# Patient Record
Sex: FEMALE | Race: WHITE
[De-identification: names, ages, dates, MRNs, and addresses within clinical notes are randomized per-mention and may not be internally consistent; named-entity substitution may affect disease eponyms.]

---

## 2017-02-02 ENCOUNTER — APPOINTMENT (OUTPATIENT)
Dept: ORTHOPEDIC SURGERY | Facility: CLINIC | Age: 67
End: 2017-02-02

## 2017-02-02 VITALS — SYSTOLIC BLOOD PRESSURE: 119 MMHG | HEART RATE: 76 BPM | DIASTOLIC BLOOD PRESSURE: 74 MMHG

## 2017-02-02 VITALS — HEIGHT: 66 IN | BODY MASS INDEX: 28.61 KG/M2 | WEIGHT: 178 LBS

## 2017-04-11 ENCOUNTER — RX RENEWAL (OUTPATIENT)
Age: 67
End: 2017-04-11

## 2017-09-28 ENCOUNTER — RX RENEWAL (OUTPATIENT)
Age: 67
End: 2017-09-28

## 2018-02-08 ENCOUNTER — OTHER (OUTPATIENT)
Age: 68
End: 2018-02-08

## 2018-02-08 ENCOUNTER — APPOINTMENT (OUTPATIENT)
Dept: ORTHOPEDIC SURGERY | Facility: CLINIC | Age: 68
End: 2018-02-08
Payer: MEDICARE

## 2018-02-08 VITALS
DIASTOLIC BLOOD PRESSURE: 68 MMHG | HEIGHT: 64 IN | HEART RATE: 75 BPM | SYSTOLIC BLOOD PRESSURE: 130 MMHG | WEIGHT: 170 LBS | BODY MASS INDEX: 29.02 KG/M2

## 2018-02-08 DIAGNOSIS — M54.6 PAIN IN THORACIC SPINE: ICD-10-CM

## 2018-02-08 DIAGNOSIS — M47.812 SPONDYLOSIS W/OUT MYELOPATHY OR RADICULOPATHY, CERVICAL REGION: ICD-10-CM

## 2018-02-08 DIAGNOSIS — M43.16 SPONDYLOLISTHESIS, LUMBAR REGION: ICD-10-CM

## 2018-02-08 PROCEDURE — 99215 OFFICE O/P EST HI 40 MIN: CPT

## 2018-02-08 PROCEDURE — 72040 X-RAY EXAM NECK SPINE 2-3 VW: CPT

## 2018-02-08 PROCEDURE — 72070 X-RAY EXAM THORAC SPINE 2VWS: CPT

## 2018-02-08 PROCEDURE — 72100 X-RAY EXAM L-S SPINE 2/3 VWS: CPT

## 2018-02-22 ENCOUNTER — APPOINTMENT (OUTPATIENT)
Dept: ORTHOPEDIC SURGERY | Facility: CLINIC | Age: 68
End: 2018-02-22
Payer: MEDICARE

## 2018-02-22 VITALS
WEIGHT: 172 LBS | SYSTOLIC BLOOD PRESSURE: 143 MMHG | DIASTOLIC BLOOD PRESSURE: 78 MMHG | HEART RATE: 71 BPM | BODY MASS INDEX: 29.52 KG/M2

## 2018-02-22 PROCEDURE — 73562 X-RAY EXAM OF KNEE 3: CPT | Mod: 50

## 2018-02-22 PROCEDURE — 99214 OFFICE O/P EST MOD 30 MIN: CPT

## 2018-02-22 PROCEDURE — 72170 X-RAY EXAM OF PELVIS: CPT

## 2018-03-08 ENCOUNTER — APPOINTMENT (OUTPATIENT)
Dept: ORTHOPEDIC SURGERY | Facility: CLINIC | Age: 68
End: 2018-03-08
Payer: MEDICARE

## 2018-03-08 VITALS — SYSTOLIC BLOOD PRESSURE: 133 MMHG | HEART RATE: 82 BPM | DIASTOLIC BLOOD PRESSURE: 78 MMHG

## 2018-03-08 PROCEDURE — 20610 DRAIN/INJ JOINT/BURSA W/O US: CPT | Mod: 50

## 2018-03-15 ENCOUNTER — APPOINTMENT (OUTPATIENT)
Dept: ORTHOPEDIC SURGERY | Facility: CLINIC | Age: 68
End: 2018-03-15
Payer: MEDICARE

## 2018-03-15 PROCEDURE — 20610 DRAIN/INJ JOINT/BURSA W/O US: CPT | Mod: 50

## 2018-03-22 ENCOUNTER — APPOINTMENT (OUTPATIENT)
Dept: ORTHOPEDIC SURGERY | Facility: CLINIC | Age: 68
End: 2018-03-22
Payer: MEDICARE

## 2018-03-22 PROCEDURE — 20610 DRAIN/INJ JOINT/BURSA W/O US: CPT | Mod: 50

## 2018-05-17 ENCOUNTER — APPOINTMENT (OUTPATIENT)
Dept: ORTHOPEDIC SURGERY | Facility: CLINIC | Age: 68
End: 2018-05-17

## 2018-07-23 PROBLEM — M47.812 CERVICAL SPONDYLOSIS: Status: ACTIVE | Noted: 2018-02-08

## 2018-08-13 ENCOUNTER — APPOINTMENT (OUTPATIENT)
Dept: ORTHOPEDIC SURGERY | Facility: CLINIC | Age: 68
End: 2018-08-13
Payer: MEDICARE

## 2018-08-13 VITALS
HEART RATE: 82 BPM | WEIGHT: 175 LBS | HEIGHT: 64 IN | BODY MASS INDEX: 29.88 KG/M2 | SYSTOLIC BLOOD PRESSURE: 122 MMHG | DIASTOLIC BLOOD PRESSURE: 78 MMHG

## 2018-08-13 PROCEDURE — 99213 OFFICE O/P EST LOW 20 MIN: CPT

## 2018-09-14 ENCOUNTER — RX RENEWAL (OUTPATIENT)
Age: 68
End: 2018-09-14

## 2018-11-27 ENCOUNTER — RX RENEWAL (OUTPATIENT)
Age: 68
End: 2018-11-27

## 2019-08-21 ENCOUNTER — APPOINTMENT (OUTPATIENT)
Dept: ORTHOPEDIC SURGERY | Facility: CLINIC | Age: 69
End: 2019-08-21
Payer: MEDICARE

## 2019-08-21 PROCEDURE — 73565 X-RAY EXAM OF KNEES: CPT

## 2019-08-21 PROCEDURE — 99213 OFFICE O/P EST LOW 20 MIN: CPT

## 2019-08-21 RX ORDER — IBUPROFEN 400 MG/1
400 TABLET, FILM COATED ORAL 3 TIMES DAILY
Qty: 90 | Refills: 0 | Status: ACTIVE | COMMUNITY
Start: 2019-08-21 | End: 1900-01-01

## 2019-08-21 NOTE — DISCUSSION/SUMMARY
[de-identified] : 67-year-old female with bilateral knee osteoarthritis\par \par Patient presents for refill on physical therapy bilateral knees and repeat of HA injections. Patient has degenerative arthrosis. \par \par Prescription provided and medication ordered. \par \par Followup for injection therapy.

## 2019-08-21 NOTE — PHYSICAL EXAM
[Antalgic] : antalgic [Poor Appearance] : well-appearing [Acute Distress] : not in acute distress [Obese] : not obese [FreeTextEntry2] : Oriented to time, place, person\par Mood: Normal\par Affect: Normal\par \par Right knee exam\par \par Skin: Clean, dry, intact\par Inspection: Varus malalignment, no masses, mild swelling, mild effusion\par Pulses: 2+ DP/PT pulses\par ROM: 0-110 degrees of flexion. No pain with deep knee flexion/extension.\par Tenderness: Mild MJLT. Mild LJLT. Positive pain over the patella facets. No pain to the quadriceps tendon. No pain to the patella tendon. No posterior knee tenderness.\par Stability: Stable to varus, valgus. Negative lachman testing. Negative anterior drawer, negative posterior drawer.\par Strength: 4+/5 Q/H/TA/GS/EHL, without atrophy\par Neuro: In tact to light touch throughout, DTR's normal\par Additional tests: None McMurrays test, mild patellar grind test. \par \par  [de-identified] : Images were reviewed from [facility] dated 8/21/19. \par \par Multiple images right and left knee showed no evidence of bony injury, or catherine dislocation. There is severe medial  degenerative arthritic change seen. Overall alignment is varus. Otherwise unremarkable.

## 2019-08-21 NOTE — HISTORY OF PRESENT ILLNESS
[All Other ROS Normal] : All other review of systems are negative except as noted [Joint Pain] : joint pain [Joint Stiffness] : joint stiffness [Joint Swelling] : joint swelling [Past Hx] : past medical [Fam Hx] : family [All] : medication and allergy [Soc Hx] : social [FreeTextEntry1] : Bilateral knee OA [FreeTextEntry2] : 66 yo female presents with her daughter for chronic, bilateral knee degenerative arthritis - last seen in 2018.  She is attending PT which she finds it beneficial. She received HA injection 3/2018 which gave her great relief. She would like to continue PT and restart HA injections. Rate her pain  7/10 and she is not taking main medication.

## 2019-09-03 ENCOUNTER — NON-APPOINTMENT (OUTPATIENT)
Age: 69
End: 2019-09-03

## 2019-09-03 ENCOUNTER — APPOINTMENT (OUTPATIENT)
Dept: OPHTHALMOLOGY | Facility: CLINIC | Age: 69
End: 2019-09-03
Payer: MEDICARE

## 2019-09-03 PROCEDURE — 92004 COMPRE OPH EXAM NEW PT 1/>: CPT

## 2019-09-09 ENCOUNTER — APPOINTMENT (OUTPATIENT)
Dept: ORTHOPEDIC SURGERY | Facility: CLINIC | Age: 69
End: 2019-09-09
Payer: MEDICARE

## 2019-09-09 VITALS
BODY MASS INDEX: 29.88 KG/M2 | HEART RATE: 71 BPM | SYSTOLIC BLOOD PRESSURE: 130 MMHG | WEIGHT: 175 LBS | HEIGHT: 64 IN | DIASTOLIC BLOOD PRESSURE: 74 MMHG

## 2019-09-09 PROCEDURE — 20610 DRAIN/INJ JOINT/BURSA W/O US: CPT | Mod: RT

## 2019-09-09 PROCEDURE — 99214 OFFICE O/P EST MOD 30 MIN: CPT | Mod: 25

## 2019-09-09 NOTE — PROCEDURE
[de-identified] : Injection: Right knee joint.\par Indication: Osteoarthritis.\par \par A discussion was had with the patient regarding this procedure and all questions were answered. All risks, benefits and alternatives were discussed. These included but were not limited to bleeding, infection, and allergic reaction. Alcohol was used to clean the skin, and betadine was used to sterilize and prep the area in the supero-lateral aspect of the right knee. Ethyl chloride spray was then used as a topical anesthetic. A 21-gauge needle was used to inject 2 cc of Euflexxa into the knee. A sterile bandage was then applied. The patient tolerated the procedure well and there were no complications. \par \par Injection: Left knee joint.\par Indication: Osteoarthritis.\par \par A discussion was had with the patient regarding this procedure and all questions were answered. All risks, benefits and alternatives were discussed. These included but were not limited to bleeding, infection, and allergic reaction. Alcohol was used to clean the skin, and betadine was used to sterilize and prep the area in the supero-lateral aspect of the left knee. Ethyl chloride spray was then used as a topical anesthetic. A 21-gauge needle was used to inject 2 cc of Euflexxa into the knee. A sterile bandage was then applied. The patient tolerated the procedure well and there were no complications.

## 2019-09-09 NOTE — END OF VISIT
[FreeTextEntry3] : 68-year-old female with bilateral knee osteoarthritis\par \par The first of three Euflexxa injections was given today under sterile conditions into the bilateral knee joint without complication. The patient was instructed on modification of activities over the next 48-72 hours. I advised the patient to ice the knee as needed for control of local irritation from the injection. I advised that some patients have immediate benefit from the initial injection therapy, however it usually takes the medication a number of weeks (~8wks) to provide significant relief of osteoarthritic symptoms. \par \par We will see the patient back for the second injection in a weeks time.

## 2019-09-16 ENCOUNTER — APPOINTMENT (OUTPATIENT)
Dept: ORTHOPEDIC SURGERY | Facility: CLINIC | Age: 69
End: 2019-09-16
Payer: MEDICARE

## 2019-09-16 PROCEDURE — 20610 DRAIN/INJ JOINT/BURSA W/O US: CPT | Mod: 50

## 2019-09-16 NOTE — END OF VISIT
[FreeTextEntry3] : 68-year-old female with bilateral knee osteoarthritis\par \par The second of three Euflexxa injections was given today under sterile conditions into the bilateral knee joint without complication. I again discussed the role of activity modification/icing following the injection to treat any local irritation from the injection. \par \par We'll have the patient followup for the final injection next week.

## 2019-09-16 NOTE — PROCEDURE
[de-identified] : Injection: Right knee joint.\par Indication: Osteoarthritis.\par \par A discussion was had with the patient regarding this procedure and all questions were answered. All risks, benefits and alternatives were discussed. These included but were not limited to bleeding, infection, and allergic reaction. Alcohol was used to clean the skin, and betadine was used to sterilize and prep the area in the supero-lateral aspect of the right knee. Ethyl chloride spray was then used as a topical anesthetic. A 21-gauge needle was used to inject 2 cc of Euflexxa into the knee. A sterile bandage was then applied. The patient tolerated the procedure well and there were no complications. \par \par Injection: Left knee joint.\par Indication: Osteoarthritis.\par \par A discussion was had with the patient regarding this procedure and all questions were answered. All risks, benefits and alternatives were discussed. These included but were not limited to bleeding, infection, and allergic reaction. Alcohol was used to clean the skin, and betadine was used to sterilize and prep the area in the supero-lateral aspect of the left knee. Ethyl chloride spray was then used as a topical anesthetic. A 21-gauge needle was used to inject 2 cc of Euflexxa into the knee. A sterile bandage was then applied. The patient tolerated the procedure well and there were no complications.

## 2019-09-23 ENCOUNTER — APPOINTMENT (OUTPATIENT)
Dept: ORTHOPEDIC SURGERY | Facility: CLINIC | Age: 69
End: 2019-09-23
Payer: MEDICARE

## 2019-09-23 DIAGNOSIS — M17.12 UNILATERAL PRIMARY OSTEOARTHRITIS, LEFT KNEE: ICD-10-CM

## 2019-09-23 DIAGNOSIS — M17.11 UNILATERAL PRIMARY OSTEOARTHRITIS, RIGHT KNEE: ICD-10-CM

## 2019-09-23 PROCEDURE — 20610 DRAIN/INJ JOINT/BURSA W/O US: CPT | Mod: 50

## 2019-09-24 NOTE — PROCEDURE
[de-identified] : Injection: Bilateral knee joints.\par \par Indication: Osteoarthritis.\par \par A discussion was had with the patient regarding this procedure and all questions were answered. All risks, benefits and alternatives were discussed. These included but were not limited to bleeding, infection, and allergic reaction. Alcohol was used to clean the skin, and betadine was used to sterilize and prep the area in the supero-lateral aspect of both knees. Ethyl chloride spray was then used as a topical anesthetic. A 21-gauge needle was used to inject 2 cc of Euflexxa into the knee. A sterile bandage was then applied. The patient tolerated the procedure well and there were no complications.		\par

## 2019-09-24 NOTE — END OF VISIT
[FreeTextEntry3] : The first of three Euflexxa injections was given today under sterile conditions into the ___ knee joint without complication. The patient was instructed on modification of activities over the next 48-72 hours. I advised the patient to ice the knee as needed for control of local irritation from the injection. I advised that some patients have immediate benefit from the initial injection therapy, however it usually takes the medication a number of weeks (~8wks) to provide significant relief of osteoarthritic symptoms.\par \par  We will see the patient back for the second injection in a weeks time.

## 2019-09-24 NOTE — ADDENDUM
[FreeTextEntry1] : This note was written by Nanci White on 09/23/2019 acting solely as a scribe for Dr. Jay Elizalde.\par \par All medical record entries made by the Scribe were at my, Dr. Jay Elizalde, direction and personally dictated by me on 09/23/2019. I have personally reviewed the chart and agree that the record accurately reflects my personal performance of the history, physical exam, assessment and plan.

## 2019-09-24 NOTE — PROCEDURE
[de-identified] : Injection: Bilateral knee joints.\par \par Indication: Osteoarthritis.\par \par A discussion was had with the patient regarding this procedure and all questions were answered. All risks, benefits and alternatives were discussed. These included but were not limited to bleeding, infection, and allergic reaction. Alcohol was used to clean the skin, and betadine was used to sterilize and prep the area in the supero-lateral aspect of both knees. Ethyl chloride spray was then used as a topical anesthetic. A 21-gauge needle was used to inject 2 cc of Euflexxa into the knee. A sterile bandage was then applied. The patient tolerated the procedure well and there were no complications.		\par

## 2020-06-18 ENCOUNTER — APPOINTMENT (OUTPATIENT)
Dept: ORTHOPEDIC SURGERY | Facility: CLINIC | Age: 70
End: 2020-06-18
Payer: MEDICARE

## 2020-06-18 VITALS — HEIGHT: 64 IN

## 2020-06-18 VITALS — BODY MASS INDEX: 30.21 KG/M2 | WEIGHT: 176 LBS

## 2020-06-18 VITALS — TEMPERATURE: 97.8 F

## 2020-06-18 PROCEDURE — 20610 DRAIN/INJ JOINT/BURSA W/O US: CPT | Mod: 50

## 2020-06-18 PROCEDURE — 99214 OFFICE O/P EST MOD 30 MIN: CPT | Mod: 25

## 2020-06-18 RX ORDER — DICLOFENAC SODIUM 10 MG/G
1 GEL TOPICAL DAILY
Qty: 1 | Refills: 0 | Status: ACTIVE | COMMUNITY
Start: 2020-06-18 | End: 1900-01-01

## 2020-06-18 NOTE — HISTORY OF PRESENT ILLNESS
[de-identified] : 70 yo female presents with her daughter for chronic, bilateral knee degenerative arthritis last seen in 8/2019.  PT in the past has been beneficial. She received HA injection 9/2019 which gave her great relief. She would like to continue PT and restart HA injections. Rate her pain 8/10 and she is not taking main medication.

## 2020-06-18 NOTE — PROCEDURE
[de-identified] : Injection: Right knee joint.\par Indication: Osteoarthritis.\par \par A discussion was had with the patient regarding this procedure and all questions were answered. All risks, benefits and alternatives were discussed. These included but were not limited to bleeding, infection, and allergic reaction. Alcohol was used to clean the skin, and betadine was used to sterilize and prep the area in the supero-lateral aspect of the right knee. Ethyl chloride spray was then used as a topical anesthetic. A 21-gauge needle was used to inject 2 cc of Euflexxa into the knee. A sterile bandage was then applied. The patient tolerated the procedure well and there were no complications. \par \par Injection: Left knee joint.\par Indication: Osteoarthritis.\par \par A discussion was had with the patient regarding this procedure and all questions were answered. All risks, benefits and alternatives were discussed. These included but were not limited to bleeding, infection, and allergic reaction. Alcohol was used to clean the skin, and betadine was used to sterilize and prep the area in the supero-lateral aspect of the left knee. Ethyl chloride spray was then used as a topical anesthetic. A 21-gauge needle was used to inject 2 cc of Euflexxa into the knee. A sterile bandage was then applied. The patient tolerated the procedure well and there were no complications.

## 2020-06-18 NOTE — PHYSICAL EXAM
[de-identified] : General: well-appearing, not in acute distress and not obese. \par Gait: antalgic. \par Oriented to time, place, person\par Mood: Normal\par Affect: Normal\par \par Left knee exam\par \par Skin: Clean, dry, intact\par Inspection: Varus malalignment, no masses, mild swelling, mild effusion\par Pulses: 2+ DP/PT pulses\par ROM: 0-110 degrees of flexion. No pain with deep knee flexion/extension.\par Tenderness: Mild MJLT. Mild LJLT. Positive pain over the patella facets. No pain to the quadriceps tendon. No pain to the patella tendon. No posterior knee tenderness.\par Stability: Stable to varus, valgus. Negative lachman testing. Negative anterior drawer, negative posterior drawer.\par Strength: 4+/5 Q/H/TA/GS/EHL, without atrophy\par Neuro: In tact to light touch throughout, DTR's normal\par Additional tests: None McMurrays test, mild patellar grind test. \par \par Right knee exam\par \par Skin: Clean, dry, intact\par Inspection: Varus malalignment, no masses, mild swelling, mild effusion\par Pulses: 2+ DP/PT pulses\par ROM: 0-110 degrees of flexion. No pain with deep knee flexion/extension.\par Tenderness: Mild MJLT. Mild LJLT. Positive pain over the patella facets. No pain to the quadriceps tendon. No pain to the patella tendon. No posterior knee tenderness.\par Stability: Stable to varus, valgus. Negative lachman testing. Negative anterior drawer, negative posterior drawer.\par Strength: 4+/5 Q/H/TA/GS/EHL, without atrophy\par Neuro: In tact to light touch throughout, DTR's normal\par Additional tests: None McMurrays test, mild patellar grind test. \par  [de-identified] : Images were reviewed from [facility] dated 8/21/19. \par \par Multiple images right and left knee showed no evidence of bony injury, or catherine dislocation. There is severe medial degenerative arthritic change seen. Overall alignment is varus. Otherwise unremarkable. \par

## 2020-06-18 NOTE — DISCUSSION/SUMMARY
[de-identified] : 69-year-old female with bilateral knee osteoarthritis\par \par The first of three Euflexxa injections was given today under sterile conditions into the bilateral knee joint without complication. The patient was instructed on modification of activities over the next 48-72 hours. I advised the patient to ice the knee as needed for control of local irritation from the injection. I advised that some patients have immediate benefit from the initial injection therapy, however it usually takes the medication a number of weeks (~8wks) to provide significant relief of osteoarthritic symptoms. \par \par We will see the patient back for the second injection in a weeks time.

## 2020-06-25 ENCOUNTER — APPOINTMENT (OUTPATIENT)
Dept: ORTHOPEDIC SURGERY | Facility: CLINIC | Age: 70
End: 2020-06-25
Payer: MEDICARE

## 2020-06-25 VITALS — TEMPERATURE: 97.2 F

## 2020-06-25 PROCEDURE — 20610 DRAIN/INJ JOINT/BURSA W/O US: CPT | Mod: 50

## 2020-06-25 NOTE — END OF VISIT
[FreeTextEntry3] : 69-year-old female with bilateral knee osteoarthritis\par \par The second of three Euflexxa injections was given today under sterile conditions into the bilateral knee joint without complication. I again discussed the role of activity modification/icing following the injection to treat any local irritation from the injection. \par \par We'll have the patient followup for the final injection next week.

## 2020-06-25 NOTE — PROCEDURE
[de-identified] : Injection: Right knee joint.\par Indication: Osteoarthritis.\par \par A discussion was had with the patient regarding this procedure and all questions were answered. All risks, benefits and alternatives were discussed. These included but were not limited to bleeding, infection, and allergic reaction. Alcohol was used to clean the skin, and betadine was used to sterilize and prep the area in the supero-lateral aspect of the right knee. Ethyl chloride spray was then used as a topical anesthetic. A 21-gauge needle was used to inject 2 cc of Euflexxa into the knee. A sterile bandage was then applied. The patient tolerated the procedure well and there were no complications. \par \par Injection: Left knee joint.\par Indication: Osteoarthritis.\par \par A discussion was had with the patient regarding this procedure and all questions were answered. All risks, benefits and alternatives were discussed. These included but were not limited to bleeding, infection, and allergic reaction. Alcohol was used to clean the skin, and betadine was used to sterilize and prep the area in the supero-lateral aspect of the left knee. Ethyl chloride spray was then used as a topical anesthetic. A 21-gauge needle was used to inject 2 cc of Euflexxa into the knee. A sterile bandage was then applied. The patient tolerated the procedure well and there were no complications.

## 2020-07-02 ENCOUNTER — APPOINTMENT (OUTPATIENT)
Dept: ORTHOPEDIC SURGERY | Facility: CLINIC | Age: 70
End: 2020-07-02
Payer: MEDICARE

## 2020-07-02 VITALS — TEMPERATURE: 97.3 F

## 2020-07-02 PROCEDURE — 20610 DRAIN/INJ JOINT/BURSA W/O US: CPT | Mod: 50

## 2020-08-04 NOTE — PROCEDURE
[de-identified] : Injection: Right knee joint.\par Indication: Osteoarthritis.\par \par A discussion was had with the patient regarding this procedure and all questions were answered. All risks, benefits and alternatives were discussed. These included but were not limited to bleeding, infection, and allergic reaction. Alcohol was used to clean the skin, and betadine was used to sterilize and prep the area in the supero-lateral aspect of the right knee. Ethyl chloride spray was then used as a topical anesthetic. A 21-gauge needle was used to inject 2 cc of Euflexxa into the knee. A sterile bandage was then applied. The patient tolerated the procedure well and there were no complications. \par \par Injection: Left knee joint.\par Indication: Osteoarthritis.\par \par A discussion was had with the patient regarding this procedure and all questions were answered. All risks, benefits and alternatives were discussed. These included but were not limited to bleeding, infection, and allergic reaction. Alcohol was used to clean the skin, and betadine was used to sterilize and prep the area in the supero-lateral aspect of the left knee. Ethyl chloride spray was then used as a topical anesthetic. A 21-gauge needle was used to inject 2 cc of Euflexxa into the knee. A sterile bandage was then applied. The patient tolerated the procedure well and there were no complications.

## 2020-08-04 NOTE — END OF VISIT
[FreeTextEntry3] : 69-year-old female with bilateral knee osteoarthritis\par \par The final Euflexxa injection was given today under sterile conditions into the bilateral knee joint without complication. I discussed the effects of this medication and how long it may provide benefit. Patient has obtained mild immediate improvement. If no significant long-term benefit, the patient may elect for additional treatment strategies as previously discussed. However, if the patient obtains good relief of symptoms, the injection therapy series can be repeated over the next 6-12 months.\par \par We'll have the patient followup on an as-needed basis at this time.

## 2020-08-13 ENCOUNTER — APPOINTMENT (OUTPATIENT)
Dept: ORTHOPEDIC SURGERY | Facility: CLINIC | Age: 70
End: 2020-08-13
Payer: MEDICARE

## 2020-08-13 VITALS
HEART RATE: 72 BPM | WEIGHT: 175 LBS | SYSTOLIC BLOOD PRESSURE: 139 MMHG | DIASTOLIC BLOOD PRESSURE: 79 MMHG | BODY MASS INDEX: 29.88 KG/M2 | TEMPERATURE: 97.6 F | HEIGHT: 64 IN

## 2020-08-13 DIAGNOSIS — M17.0 BILATERAL PRIMARY OSTEOARTHRITIS OF KNEE: ICD-10-CM

## 2020-08-13 PROCEDURE — 99214 OFFICE O/P EST MOD 30 MIN: CPT

## 2020-08-13 PROCEDURE — 73562 X-RAY EXAM OF KNEE 3: CPT | Mod: 50

## 2020-08-13 RX ORDER — MELOXICAM 15 MG/1
15 TABLET ORAL DAILY
Qty: 30 | Refills: 1 | Status: ACTIVE | COMMUNITY
Start: 2020-08-13 | End: 1900-01-01

## 2020-08-13 NOTE — PHYSICAL EXAM
[de-identified] : The following radiographs were ordered and read by me during this patients visit. I reviewed each radiograph in detail with the patient and discussed the findings as highlighted below. \par AP, lateral and skyline views of the bilateral knees confirm advanced degenerative joint disease with medial joint space narrowing with bone on bone articulation and flattening femoral condyles with osteophyte formation. \par AP view of the pelvis taken previously are within normal limits\par  [de-identified] : On general examination the patient is adequately groomed and nourished. The vital parameters are as recorded. \par There is no lymphedema or diffuse swelling, no varicose veins, no skin warmth/erythema/scars/swelling, no ulcers and no palpable lymph nodes or masses in both lower extremities. Bilateral pedal pulses are well palpable.\par Upper Extremity:\par Both right and left upper extremities are unremarkable in terms of skin rash, lesions, pigmentation, redness, tenderness, swelling, joint instability, abnormal deformity or crepitus. The overall range of motion, sensation, motor tone and strength testing are normal.\par \par Bilateral Knee Exam: \par The gait is bilateral stiff knee antalgic.\par Knee alignment:      varus bilaterally\par Both knees demonstrate no scars and the skin has no warmth, erythema, swelling or tenderness. \par Both knees have a range of motion of\par Extension:                    Right -5 degrees     Left 0 degrees\par Flexion:                                   Right 95 degrees      Left 100 degrees\par There is bilateral knee medial joint line tenderness. There is bilateral knee mild effusion. \par Hernan's test is positive. Debi test is positive.\par Lachman's test, Anterior/Posterior Drawer test and Pivot Shift Tests are negative. \par There is bilateral knee grade 1 MCL mediolateral laxity and no anteroposterior instability. \par Patella compression test is positive and patellofemoral tracking is normal with no lateral subluxation, apprehension or instability. \par Right knee quadriceps and hamstrings power is 4+.\par Left knee quadriceps and hamstrings power is 4+.\par \par Hip Exam:\par The gait and station is normal\par The patient has equal leg lengths and no pelvic tilt. Russel/Katelyn test is 7 inches on the right and 7 inches on the left. Active SLR is 60 degrees on the right and 60 degrees on the left. Both hips demonstrate no scars and the skin has no signs of inflammation or tenderness. \par Both Hips have a normal range of motion of flexion to 100 degrees, abduction 40 degrees, adduction 20 degrees, external rotation 40 degrees, internal rotation 20 degrees with symmetrical motion in flexion and extension. There is no flexion contracture, deformity or instability. Labral impingement tests are negative.\par Both hips flexor, abductor and extensor power is normal.\par \par Neurology:\par The patient is alert and oriented in person, place and time. The mood is calm and affect is normal.\par Testing for coordination including Rhomberg's Test, sensation, motor tone and power and deep tendon reflexes in both lower extremities is normal.\par

## 2020-08-13 NOTE — DISCUSSION/SUMMARY
[de-identified] : Bilateral knee advanced degenerative joint disease \par The natural history and treatment of degenerative arthritis was discussed with the patient at length today. The spectrum of treatment including nonoperative modalities to surgical intervention was elucidated. Noninvasive and nonoperative treatment modalities include weight reduction, activity modification with low impact exercise,  as needed use of acetaminophen or anti-inflammatory medications if tolerated, glucosamine/chondroitin supplements, and physical therapy. Further treatments can include corticosteroid injection and the use of viscosupplementation with hyaluronic acid injections. Definitive surgical treatment can certainly include total joint arthroplasty also. The risks and benefits of each treatment options was discussed and all questions were answered.\par In view of lack of adequate pain relief with conservative (non-surgical) management protocol including physical therapy, home exercises, weight loss, activity modification, NSAIDS; the patient is recommended to consider a right Total Knee Replacement. \par \par The risks, benefits, alternatives, implications, complications including but not limited to pain, stiffness, bleeding, limp, wound breakdown, infection, bone fracture, nerve and vascular compromise, implant wear, fixation options depending on bone quality, instability, and durability issues and rehabilitation were discussed and relevant questions were addressed. The possibility of recurrent pain, no improvement in pain and actual worsening of the pain were also mentioned in conversation with the patient. Medical complications related to the patient's general medical health including deep vein thrombosis, pulmonary embolus, heart attack, stroke, death and other complications from anesthesia were discussed as well.  Anticoagulation prophylaxis medication options to address risks of deep vein thrombosis and pulmonary embolism were discussed and weighed against the risks of bleeding and wound healing complications. The patient elected Ecotrin/Xarelto prophylaxis with mechanical modalities.\par \par  I have reviewed the plan of care as well as a model of a total knee replacement implant equivalent to the one that will be used for their total knee replacement.  The patient agrees with the plan of care, as well as the use of implants for their total knee replacement.  The patient wishes to proceed and will undergo preoperative medical evaluation and clearance, attend the preoperative educational class and will schedule surgery appropriately.\par \par Patient has been indicated for a JAI Robotic Assisted total knee replacement. A prescription for a CT scan with JAI Protocol was provided for the right knee.\par \par The patient was advised that a COVID PCR test would be required within 72 hours prior to surgery.

## 2020-08-13 NOTE — HISTORY OF PRESENT ILLNESS
[Worsening] : worsening [9] : a current pain level of 9/10 [Constant] : ~He/She~ states the symptoms seem to be constant [Walking] : worsened by walking [Rest] : relieved by rest [de-identified] : Ms. ISIDORO WARNER is a 69 year old female  presenting with bilateral knee pain, chronic, worsening for many years.  She localizes the pain to the medial anterior aspect of the bilateral knees. She describes the pain as constant, and states the pain is present when standing from a seated position, climbing stairs, walking more than 1 block. She admits to occasional swelling, clicking and grinding of the knee. She has occasional buckling as well. \par She has been treated conservatively in the past with physical therapy, cortisone injections, as well as viscosupplementation injections. She was last seen in this office by Dr. Elizalde, and received HA injections in June 2020. She has received multiple series of these injections since 2015. she notes initially her knee pain was better following the injections, but after this round, she has had worsening pain. She also admits to lower back pain, along with neck pain, with corresponding stiffness and difficulty moving her body. She has been using voltaren gel for her knee pain, but doesn’t take oral medications for pain at this time. she admits to limitations of quality of life, and is here to discuss options for treatment.\par

## 2020-08-13 NOTE — CONSULT LETTER
[Dear  ___] : Dear  [unfilled], [Consult Letter:] : I had the pleasure of evaluating your patient, [unfilled]. [Please see my note below.] : Please see my note below. [Sincerely,] : Sincerely, [Consult Closing:] : Thank you very much for allowing me to participate in the care of this patient.  If you have any questions, please do not hesitate to contact me. [FreeTextEntry2] : MARCY JOHNSON\par  [FreeTextEntry3] : Danie Wolfe MD\par \par ______________________________________________\par [facility]: Hip/Knee Arthroplasty\par [address], [address], [place] [address]\par (t) [phone]\par (f) [phone]\par

## 2021-04-21 ENCOUNTER — APPOINTMENT (OUTPATIENT)
Dept: DISASTER EMERGENCY | Facility: OTHER | Age: 71
End: 2021-04-21
Payer: MEDICARE

## 2021-04-21 PROCEDURE — 0002A: CPT

## 2021-08-12 ENCOUNTER — APPOINTMENT (OUTPATIENT)
Dept: OPHTHALMOLOGY | Facility: CLINIC | Age: 71
End: 2021-08-12
Payer: MEDICARE

## 2021-08-12 ENCOUNTER — NON-APPOINTMENT (OUTPATIENT)
Age: 71
End: 2021-08-12

## 2021-08-12 PROCEDURE — 92014 COMPRE OPH EXAM EST PT 1/>: CPT

## 2022-03-10 ENCOUNTER — NON-APPOINTMENT (OUTPATIENT)
Age: 72
End: 2022-03-10

## 2022-03-10 ENCOUNTER — APPOINTMENT (OUTPATIENT)
Dept: OPHTHALMOLOGY | Facility: CLINIC | Age: 72
End: 2022-03-10
Payer: MEDICARE

## 2022-03-10 PROCEDURE — 92012 INTRM OPH EXAM EST PATIENT: CPT

## 2024-01-24 ENCOUNTER — NON-APPOINTMENT (OUTPATIENT)
Age: 74
End: 2024-01-24

## 2024-01-24 ENCOUNTER — APPOINTMENT (OUTPATIENT)
Dept: OPHTHALMOLOGY | Facility: CLINIC | Age: 74
End: 2024-01-24
Payer: MEDICARE

## 2024-01-24 PROCEDURE — 92015 DETERMINE REFRACTIVE STATE: CPT

## 2024-01-24 PROCEDURE — 92014 COMPRE OPH EXAM EST PT 1/>: CPT

## 2024-09-13 ENCOUNTER — APPOINTMENT (OUTPATIENT)
Dept: OPHTHALMOLOGY | Facility: CLINIC | Age: 74
End: 2024-09-13
Payer: MEDICARE

## 2024-09-13 ENCOUNTER — NON-APPOINTMENT (OUTPATIENT)
Age: 74
End: 2024-09-13

## 2024-09-13 PROCEDURE — 92015 DETERMINE REFRACTIVE STATE: CPT

## 2024-10-29 ENCOUNTER — NON-APPOINTMENT (OUTPATIENT)
Age: 74
End: 2024-10-29

## 2024-10-29 ENCOUNTER — APPOINTMENT (OUTPATIENT)
Dept: OPHTHALMOLOGY | Facility: CLINIC | Age: 74
End: 2024-10-29
Payer: MEDICARE

## 2024-10-29 PROCEDURE — 92012 INTRM OPH EXAM EST PATIENT: CPT

## 2025-02-27 ENCOUNTER — APPOINTMENT (OUTPATIENT)
Dept: OPHTHALMOLOGY | Facility: CLINIC | Age: 75
End: 2025-02-27

## 2025-06-12 ENCOUNTER — APPOINTMENT (OUTPATIENT)
Dept: OPHTHALMOLOGY | Facility: CLINIC | Age: 75
End: 2025-06-12